# Patient Record
Sex: FEMALE | Race: BLACK OR AFRICAN AMERICAN | NOT HISPANIC OR LATINO
[De-identification: names, ages, dates, MRNs, and addresses within clinical notes are randomized per-mention and may not be internally consistent; named-entity substitution may affect disease eponyms.]

---
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
## 2023-05-31 ENCOUNTER — OUTPATIENT (OUTPATIENT)
Dept: OUTPATIENT SERVICES | Facility: HOSPITAL | Age: 18
LOS: 1 days | Discharge: HOME | End: 2023-05-31

## 2023-05-31 DIAGNOSIS — R79.1 ABNORMAL COAGULATION PROFILE: ICD-10-CM

## 2023-05-31 DIAGNOSIS — N75.0 CYST OF BARTHOLIN'S GLAND: Chronic | ICD-10-CM

## 2023-06-07 ENCOUNTER — APPOINTMENT (OUTPATIENT)
Dept: OTOLARYNGOLOGY | Facility: CLINIC | Age: 18
End: 2023-06-07
Payer: COMMERCIAL

## 2023-06-07 VITALS
WEIGHT: 126 LBS | DIASTOLIC BLOOD PRESSURE: 74 MMHG | SYSTOLIC BLOOD PRESSURE: 116 MMHG | HEIGHT: 64 IN | BODY MASS INDEX: 21.51 KG/M2 | HEART RATE: 101 BPM

## 2023-06-07 PROCEDURE — 99213 OFFICE O/P EST LOW 20 MIN: CPT

## 2023-06-07 RX ORDER — AMOXICILLIN AND CLAVULANATE POTASSIUM 875; 125 MG/1; MG/1
875-125 TABLET, COATED ORAL
Qty: 20 | Refills: 0 | Status: ACTIVE | COMMUNITY
Start: 2023-06-07 | End: 1900-01-01

## 2023-06-07 RX ORDER — METHYLPREDNISOLONE 4 MG/1
4 TABLET ORAL
Qty: 1 | Refills: 0 | Status: ACTIVE | COMMUNITY
Start: 2023-06-07 | End: 1900-01-01

## 2023-06-07 NOTE — ASSESSMENT
[FreeTextEntry1] : Suzan Gan presents for follow-up. She was previously scheduled for tonsillectomy and adenoidectomy but this was canceled due to abnormal coags. She was worked up by hematology. 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 She was told that she has a slightly elevated risk of postop bleeding. She has acute tonsillitis currently and bilateral tonsillar hypertrophy. She is seeing her hematologist again in one week. Based on this discussion, can decide again on surgery and whether or not preop, intraop, or postop protocol is necessary. \par \par - Augmentin x 10 days. Side effects were discussed and include but are not limited to nausea, vomiting, diarrhea, and skin rash.\par - Medrol dose pack. The potential side effects of high-dose steroid use were discussed at length. These risks include but are not limited to: increased appetite, insomnia, fluid retention, mood swings, weight gain, change in blood pressure, high blood glucose, possible adrenal suppression, osteoporosis, avascular necrosis of the hip, menstrual irregularities (if applicable), and cataracts\par - Follow up after hematology appointment.\par

## 2023-06-07 NOTE — PHYSICAL EXAM
[Midline] : trachea located in midline position [Normal] : no rashes [de-identified] : 3+ tonsils bilaterally, mild erythema

## 2023-06-07 NOTE — HISTORY OF PRESENT ILLNESS
[de-identified] : Suzan Gan is a 18 yo female who was referred by Dr. Manuel for evaluation of tonsillar hypertrophy. Suzan notes that since 10/2021, she has had intermittent tonsillar swelling and sore throat. She notes current sore throat. She denies current dysphagia and odynophagia. She denies dysphonia and dyspnea. She notes occasionally feeling short of breath when she wakes up in the morning. She denies recent fevers, chills. She had recent throat culture which was negative and mono screen, also negative. She notes that the swelling and soreness have occurred eight times in the past year. She notes that rapid strep was positive once. She was put on antibiotics for each of these episodes which seemed to resolve most of these infections. She denies history of peritonsillar abscess. [FreeTextEntry1] : 6/7/23 - Suzan presents for follow-up. Her tonsillectomy and adenoidectomy were previously canceled due to abnormal coag labs. She was seen by hematology and it was noted that her risk of postop bleeding was slightly elevated. She notes that her tonsils have remain enlarged and has had tonsillitis exacerbation 3-4 times since Aug 2022. She notes current sore throat and odynophagia. She notes some dysphagia. She denies dyspnea. She denies fevers or chills.

## 2023-06-14 ENCOUNTER — APPOINTMENT (OUTPATIENT)
Dept: HEMATOLOGY ONCOLOGY | Facility: CLINIC | Age: 18
End: 2023-06-14
Payer: COMMERCIAL

## 2023-06-14 ENCOUNTER — RESULT REVIEW (OUTPATIENT)
Age: 18
End: 2023-06-14

## 2023-06-14 VITALS
OXYGEN SATURATION: 100 % | BODY MASS INDEX: 21.34 KG/M2 | HEIGHT: 64 IN | HEART RATE: 76 BPM | SYSTOLIC BLOOD PRESSURE: 127 MMHG | DIASTOLIC BLOOD PRESSURE: 78 MMHG | WEIGHT: 125.01 LBS

## 2023-06-14 DIAGNOSIS — R79.1 ABNORMAL COAGULATION PROFILE: ICD-10-CM

## 2023-06-14 LAB
BASOPHILS # BLD AUTO: 0.1 K/UL — SIGNIFICANT CHANGE UP (ref 0–0.2)
BASOPHILS NFR BLD AUTO: 0.8 % — SIGNIFICANT CHANGE UP (ref 0–2)
EOSINOPHIL # BLD AUTO: 0 K/UL — SIGNIFICANT CHANGE UP (ref 0–0.5)
EOSINOPHIL NFR BLD AUTO: 0.3 % — SIGNIFICANT CHANGE UP (ref 0–6)
HCT VFR BLD CALC: 38.6 % — SIGNIFICANT CHANGE UP (ref 34.5–45)
HGB BLD-MCNC: 13.1 G/DL — SIGNIFICANT CHANGE UP (ref 11.5–15.5)
LYMPHOCYTES # BLD AUTO: 31.9 % — SIGNIFICANT CHANGE UP (ref 13–44)
LYMPHOCYTES # BLD AUTO: 4.4 K/UL — HIGH (ref 1–3.3)
MCHC RBC-ENTMCNC: 29.8 PG — SIGNIFICANT CHANGE UP (ref 27–34)
MCHC RBC-ENTMCNC: 33.8 G/DL — SIGNIFICANT CHANGE UP (ref 32–36)
MCV RBC AUTO: 88.2 FL — SIGNIFICANT CHANGE UP (ref 80–100)
MONOCYTES # BLD AUTO: 0.9 K/UL — SIGNIFICANT CHANGE UP (ref 0–0.9)
MONOCYTES NFR BLD AUTO: 6.3 % — SIGNIFICANT CHANGE UP (ref 2–14)
NEUTROPHILS # BLD AUTO: 8.3 K/UL — HIGH (ref 1.8–7.4)
NEUTROPHILS NFR BLD AUTO: 60.7 % — SIGNIFICANT CHANGE UP (ref 43–77)
PLATELET # BLD AUTO: 341 K/UL — SIGNIFICANT CHANGE UP (ref 150–400)
RBC # BLD: 4.38 M/UL — SIGNIFICANT CHANGE UP (ref 3.8–5.2)
RBC # FLD: 13.2 % — SIGNIFICANT CHANGE UP (ref 10.3–14.5)
WBC # BLD: 13.7 K/UL — HIGH (ref 3.8–10.5)
WBC # FLD AUTO: 13.7 K/UL — HIGH (ref 3.8–10.5)

## 2023-06-14 PROCEDURE — 99214 OFFICE O/P EST MOD 30 MIN: CPT

## 2023-06-14 NOTE — HISTORY OF PRESENT ILLNESS
[de-identified] : Ms. Gan is a 17 yo AAF referred for evaluation of a prolonged PT.\par Ms Gan has a history of multiple episode of recurrent tonsillitis requiring antibiotics since childhood. She was scheduled for a Tonsillectomy, but pre op testing revealed a PT of 14.3, INR-1.21. This was repeated on 7-12-22, PT was 13.5, INR-1.16. APTT was normal.\par She denies any history of abnormal bleeding or bruising. Her periods are heavy , only on day 1, She was recently started on oral BCP.\par  Family history is that her mother has SC trait and easy bruising, grandmother has easy bruising. [de-identified] : Currently she does not have a sore throat or fever. She denies any abnormal bleeding or bruising.\par \par 06/14/23:\par Patient returns for evaluation of prolonged PT with decreased Factor VII activity since she never had her tonsillectomy last year. She recently had sore throat which has now resolved. She was treated by ENT with Augmentin and Medrol pack. She denies fever, chills, shortness of breath. No bleeding episodes.

## 2023-06-14 NOTE — PHYSICAL EXAM
[Fully active, able to carry on all pre-disease performance without restriction] : Status 0 - Fully active, able to carry on all pre-disease performance without restriction [Normal] : affect appropriate [de-identified] : no patches on enlarged tonsils bilaterally [de-identified] : RRR, normal S1S2 [de-identified] : no edema [de-identified] : no rash

## 2023-06-14 NOTE — REVIEW OF SYSTEMS
[Fever] : no fever [Chills] : no chills [Chest Pain] : no chest pain [Lower Ext Edema] : no lower extremity edema [Shortness Of Breath] : no shortness of breath [Cough] : no cough [Abdominal Pain] : no abdominal pain [Vomiting] : no vomiting [Skin Rash] : no skin rash [Dizziness] : no dizziness [Fainting] : no fainting [Easy Bleeding] : no tendency for easy bleeding [Easy Bruising] : no tendency for easy bruising [FreeTextEntry4] : recurrent sore throats [FreeTextEntry8] : no menorrhagia [FreeTextEntry9] : no bone pain

## 2023-06-14 NOTE — ASSESSMENT
[FreeTextEntry1] : Ms. Gan is a 17 yo AAF referred for evaluation of a prolonged PT.\par Ms Gan has a history of multiple episode of recurrent tonsillitis requiring antibiotics since childhood. She was scheduled for a Tonsillectomy, but pre op testing revealed a PT of 14.3, INR-1.21. This was repeated on 7-12-22, PT was 13.5, INR-1.16. APTT was normal.\par She denies any history of abnormal bleeding or bruising. Her periods are heavy , only on day 1, She was recently started on oral BCP.\par  Family history is that her mother has SC trait and easy bruising, grandmother has easy bruising.\par Dr. Cabello into see patient and reviewed history and prior lab work. he wants to continue work up of the prolonged PT. We have ordered a repeat PT/INR and a mixing study and a Factor Vll level.\par  I will review results with Dr. Cabello when available and contact patient with results and plan.\par \par 06/14/23:\par Patient returns for evaluation of prolonged PT with decreased Factor VII activity since she never had her tonsillectomy last year. She recently had sore throat which has now resolved. She was treated by ENT with Augmentin and Medrol pack. She denies fever, chills, shortness of breath. No bleeding episodes. \par Plan- \par Check PT with 50:50 mix\par Check Factor VII level\par Check CMP\par I will contact ENT to discuss tonsillectomy after lab results are back

## 2023-06-22 ENCOUNTER — NON-APPOINTMENT (OUTPATIENT)
Age: 18
End: 2023-06-22

## 2023-06-22 LAB
ALBUMIN SERPL ELPH-MCNC: 4.8 G/DL
ALP BLD-CCNC: 77 U/L
ALT SERPL-CCNC: 6 U/L
ANION GAP SERPL CALC-SCNC: 19 MMOL/L
AST SERPL-CCNC: 10 U/L
BILIRUB SERPL-MCNC: 0.3 MG/DL
BUN SERPL-MCNC: 6 MG/DL
CALCIUM SERPL-MCNC: 9.7 MG/DL
CHLORIDE SERPL-SCNC: 103 MMOL/L
CO2 SERPL-SCNC: 19 MMOL/L
CREAT SERPL-MCNC: 0.78 MG/DL
EGFR: 111 ML/MIN/1.73M2
FACT VII ACT/NOR PPP: 62 %
GLUCOSE SERPL-MCNC: 89 MG/DL
INR PPP: 1.11 RATIO
POTASSIUM SERPL-SCNC: 3.5 MMOL/L
PROT SERPL-MCNC: 7.3 G/DL
PT BLD: 13.1 SEC
SODIUM SERPL-SCNC: 142 MMOL/L

## 2023-06-28 ENCOUNTER — APPOINTMENT (OUTPATIENT)
Dept: OTOLARYNGOLOGY | Facility: CLINIC | Age: 18
End: 2023-06-28
Payer: COMMERCIAL

## 2023-06-28 VITALS
HEART RATE: 96 BPM | SYSTOLIC BLOOD PRESSURE: 107 MMHG | HEIGHT: 64 IN | WEIGHT: 125 LBS | BODY MASS INDEX: 21.34 KG/M2 | DIASTOLIC BLOOD PRESSURE: 72 MMHG

## 2023-06-28 DIAGNOSIS — J03.91 ACUTE RECURRENT TONSILLITIS, UNSPECIFIED: ICD-10-CM

## 2023-06-28 DIAGNOSIS — J35.1 HYPERTROPHY OF TONSILS: ICD-10-CM

## 2023-06-28 PROCEDURE — 99214 OFFICE O/P EST MOD 30 MIN: CPT

## 2023-06-28 NOTE — HISTORY OF PRESENT ILLNESS
[de-identified] : Suzan Gan is a 16 yo female who was referred by Dr. Manuel for evaluation of tonsillar hypertrophy. Suzan notes that since 10/2021, she has had intermittent tonsillar swelling and sore throat. She notes current sore throat. She denies current dysphagia and odynophagia. She denies dysphonia and dyspnea. She notes occasionally feeling short of breath when she wakes up in the morning. She denies recent fevers, chills. She had recent throat culture which was negative and mono screen, also negative. She notes that the swelling and soreness have occurred eight times in the past year. She notes that rapid strep was positive once. She was put on antibiotics for each of these episodes which seemed to resolve most of these infections. She denies history of peritonsillar abscess. [FreeTextEntry1] : 6/7/23 - Suzan presents for follow-up. Her tonsillectomy and adenoidectomy were previously canceled due to abnormal coag labs. She was seen by hematology and it was noted that her risk of postop bleeding was slightly elevated. She notes that her tonsils have remain enlarged and has had tonsillitis exacerbation 3-4 times since Aug 2022. She notes current sore throat and odynophagia. She notes some dysphagia. She denies dyspnea. She denies fevers or chills.\par \par 6/28/23 - Suzan presents for follow-up. She has been cleared for surgery by her hematologist. He completed augmentin and medro dose pack. She denies curernt sore throat, dysphagia, odynophagia, dyspnea. No fevers or chills.

## 2023-06-28 NOTE — DATA REVIEWED
[de-identified] : PT/INR - wnl\par Factor VII assay - wnl\par Prothrombin mix time - wnl\par Plt count - 341

## 2023-06-28 NOTE — ASSESSMENT
[FreeTextEntry1] : Suzan Gan presents for follow-up. She was previously scheduled for tonsillectomy and adenoidectomy but this was canceled due to abnormal coags. She was worked up by hematology. Her new labs were reviewed showing normal PT/INR, factor VII assay. Her hematologist has cleared her for surgery. She was recently treated for acute tonsillitis with augmentin and medrol dose pack. She continues to have recurrent tonsillitis and tonsillar hypertrophy. We will schedule her for tonsillectomy and adenoidectomy.\par \par Risks, benefits, and alternatives of surgery were discussed. Possible complications including but not limited to infection, pain, bleeding, velopharyngeal insufficiency, complications from anesthesia, and need for further surgery were discussed. Suzan would like to proceed with surgery. All questions were answered. we will schedule at her earliest convenience.\par \par Follow up 3 weeks postop.\par \par

## 2023-06-28 NOTE — PHYSICAL EXAM
[Midline] : trachea located in midline position [Normal] : no rashes [de-identified] : 3+ tonsils bilaterally, no erythema or exudate.

## 2023-07-31 ENCOUNTER — APPOINTMENT (OUTPATIENT)
Dept: OTOLARYNGOLOGY | Facility: AMBULATORY MEDICAL SERVICES | Age: 18
End: 2023-07-31
Payer: COMMERCIAL

## 2023-07-31 ENCOUNTER — RESULT REVIEW (OUTPATIENT)
Age: 18
End: 2023-07-31

## 2023-07-31 PROCEDURE — 42826 REMOVAL OF TONSILS: CPT

## 2023-07-31 RX ORDER — OXYCODONE 5 MG/1
5 TABLET ORAL EVERY 4 HOURS
Qty: 15 | Refills: 0 | Status: ACTIVE | COMMUNITY
Start: 2023-07-31 | End: 1900-01-01

## 2023-07-31 RX ORDER — ONDANSETRON 4 MG/1
4 TABLET, ORALLY DISINTEGRATING ORAL EVERY 8 HOURS
Qty: 10 | Refills: 0 | Status: ACTIVE | COMMUNITY
Start: 2023-07-31 | End: 1900-01-01

## 2023-08-22 ENCOUNTER — APPOINTMENT (OUTPATIENT)
Dept: OTOLARYNGOLOGY | Facility: CLINIC | Age: 18
End: 2023-08-22
Payer: COMMERCIAL

## 2023-08-22 VITALS
DIASTOLIC BLOOD PRESSURE: 76 MMHG | BODY MASS INDEX: 20.83 KG/M2 | SYSTOLIC BLOOD PRESSURE: 110 MMHG | HEART RATE: 83 BPM | HEIGHT: 64 IN | WEIGHT: 122 LBS

## 2023-08-22 DIAGNOSIS — Z98.890 OTHER SPECIFIED POSTPROCEDURAL STATES: ICD-10-CM

## 2023-08-22 PROCEDURE — 99024 POSTOP FOLLOW-UP VISIT: CPT

## 2023-08-22 NOTE — ASSESSMENT
[FreeTextEntry1] : Suzan Gan presents s/p bilateral tonsillectomy on 7/31/23. She has done well since surgery with no postoperative issues. Bilateral tonsillar fossa are well healed.   - Resume normal activity and diet. - Follow up prn.

## 2023-08-22 NOTE — HISTORY OF PRESENT ILLNESS
[de-identified] : Suzan Gan is a 16 yo female who was referred by Dr. Manuel for evaluation of tonsillar hypertrophy. Suzan notes that since 10/2021, she has had intermittent tonsillar swelling and sore throat. She notes current sore throat. She denies current dysphagia and odynophagia. She denies dysphonia and dyspnea. She notes occasionally feeling short of breath when she wakes up in the morning. She denies recent fevers, chills. She had recent throat culture which was negative and mono screen, also negative. She notes that the swelling and soreness have occurred eight times in the past year. She notes that rapid strep was positive once. She was put on antibiotics for each of these episodes which seemed to resolve most of these infections. She denies history of peritonsillar abscess. [FreeTextEntry1] : 6/7/23 - Suzan presents for follow-up. Her tonsillectomy and adenoidectomy were previously canceled due to abnormal coag labs. She was seen by hematology and it was noted that her risk of postop bleeding was slightly elevated. She notes that her tonsils have remain enlarged and has had tonsillitis exacerbation 3-4 times since Aug 2022. She notes current sore throat and odynophagia. She notes some dysphagia. She denies dyspnea. She denies fevers or chills.  6/28/23 - Suzan presents for follow-up. She has been cleared for surgery by her hematologist. He completed augmentin and medro dose pack. She denies curernt sore throat, dysphagia, odynophagia, dyspnea. No fevers or chills.  8/22/23 - Suzan presents s/p bilateral tonsillectomy on 7/31/23. She did well after surgery. She denies pain at this time and is on a regular diet. She is back to regular activity. She denies fevers, chills, or bleeding episodes. She denies issues with swallow.

## 2023-08-22 NOTE — PHYSICAL EXAM
[Midline] : trachea located in midline position [Removed] : palatine tonsils previously removed [de-identified] : Bilateral tonsillar fossa well healed with no clot or bleeding. [Normal] : no rashes

## 2023-08-22 NOTE — DATA REVIEWED
[de-identified] : Pathology 7/31/23: Final Diagnosis  1. Tonsil, left, tonsillectomy - Chronic tonsillitis   2. Tonsil, right, tonsillectomy - Chronic tonsillitis

## 2025-08-13 ENCOUNTER — NON-APPOINTMENT (OUTPATIENT)
Age: 20
End: 2025-08-13